# Patient Record
Sex: FEMALE | Employment: FULL TIME | URBAN - METROPOLITAN AREA
[De-identification: names, ages, dates, MRNs, and addresses within clinical notes are randomized per-mention and may not be internally consistent; named-entity substitution may affect disease eponyms.]

---

## 2018-02-09 ENCOUNTER — TELEPHONE (OUTPATIENT)
Dept: OTHER | Facility: CLINIC | Age: 53
End: 2018-02-09

## 2018-11-29 ENCOUNTER — HOSPITAL ENCOUNTER (EMERGENCY)
Facility: CLINIC | Age: 53
Discharge: HOME | End: 2018-11-29
Attending: EMERGENCY MEDICINE | Admitting: EMERGENCY MEDICINE
Payer: COMMERCIAL

## 2018-11-29 ENCOUNTER — APPOINTMENT (OUTPATIENT)
Dept: GENERAL RADIOLOGY | Facility: CLINIC | Age: 53
End: 2018-11-29
Attending: EMERGENCY MEDICINE
Payer: COMMERCIAL

## 2018-11-29 VITALS
RESPIRATION RATE: 18 BRPM | DIASTOLIC BLOOD PRESSURE: 95 MMHG | HEART RATE: 77 BPM | SYSTOLIC BLOOD PRESSURE: 149 MMHG | TEMPERATURE: 98.6 F | OXYGEN SATURATION: 99 %

## 2018-11-29 DIAGNOSIS — K21.00 GASTROESOPHAGEAL REFLUX DISEASE WITH ESOPHAGITIS: ICD-10-CM

## 2018-11-29 DIAGNOSIS — R07.89 OTHER CHEST PAIN: ICD-10-CM

## 2018-11-29 LAB
ALBUMIN SERPL-MCNC: 3.8 G/DL (ref 3.4–5)
ALP SERPL-CCNC: 111 U/L (ref 40–150)
ALT SERPL W P-5'-P-CCNC: 55 U/L (ref 0–50)
ANION GAP SERPL CALCULATED.3IONS-SCNC: 5 MMOL/L (ref 3–14)
AST SERPL W P-5'-P-CCNC: 170 U/L (ref 0–45)
BASOPHILS # BLD AUTO: 0 10E9/L (ref 0–0.2)
BASOPHILS NFR BLD AUTO: 0.4 %
BILIRUB SERPL-MCNC: 0.7 MG/DL (ref 0.2–1.3)
BUN SERPL-MCNC: 14 MG/DL (ref 7–30)
CALCIUM SERPL-MCNC: 8.6 MG/DL (ref 8.5–10.1)
CHLORIDE SERPL-SCNC: 108 MMOL/L (ref 94–109)
CO2 SERPL-SCNC: 29 MMOL/L (ref 20–32)
CREAT SERPL-MCNC: 0.58 MG/DL (ref 0.52–1.04)
DIFFERENTIAL METHOD BLD: ABNORMAL
EOSINOPHIL # BLD AUTO: 0 10E9/L (ref 0–0.7)
EOSINOPHIL NFR BLD AUTO: 0.1 %
ERYTHROCYTE [DISTWIDTH] IN BLOOD BY AUTOMATED COUNT: 13.2 % (ref 10–15)
GFR SERPL CREATININE-BSD FRML MDRD: 0 ML/MIN/1.7M2
GLUCOSE SERPL-MCNC: 106 MG/DL (ref 70–99)
HCT VFR BLD AUTO: 43.3 % (ref 35–47)
HGB BLD-MCNC: 13.8 G/DL (ref 11.7–15.7)
IMM GRANULOCYTES # BLD: 0 10E9/L (ref 0–0.4)
IMM GRANULOCYTES NFR BLD: 0.3 %
LIPASE SERPL-CCNC: 260 U/L (ref 73–393)
LYMPHOCYTES # BLD AUTO: 1.3 10E9/L (ref 0.8–5.3)
LYMPHOCYTES NFR BLD AUTO: 18.7 %
MCH RBC QN AUTO: 26 PG (ref 26.5–33)
MCHC RBC AUTO-ENTMCNC: 31.9 G/DL (ref 31.5–36.5)
MCV RBC AUTO: 82 FL (ref 78–100)
MONOCYTES # BLD AUTO: 0.3 10E9/L (ref 0–1.3)
MONOCYTES NFR BLD AUTO: 4 %
NEUTROPHILS # BLD AUTO: 5.5 10E9/L (ref 1.6–8.3)
NEUTROPHILS NFR BLD AUTO: 76.5 %
NRBC # BLD AUTO: 0 10*3/UL
NRBC BLD AUTO-RTO: 0 /100
PLATELET # BLD AUTO: 170 10E9/L (ref 150–450)
POTASSIUM SERPL-SCNC: 3.5 MMOL/L (ref 3.4–5.3)
PROT SERPL-MCNC: 8 G/DL (ref 6.8–8.8)
RBC # BLD AUTO: 5.3 10E12/L (ref 3.8–5.2)
SODIUM SERPL-SCNC: 142 MMOL/L (ref 133–144)
TROPONIN I BLD-MCNC: 0 UG/L (ref 0–0.08)
TROPONIN I SERPL-MCNC: <0.015 UG/L (ref 0–0.04)
WBC # BLD AUTO: 7.2 10E9/L (ref 4–11)

## 2018-11-29 PROCEDURE — 80053 COMPREHEN METABOLIC PANEL: CPT | Performed by: EMERGENCY MEDICINE

## 2018-11-29 PROCEDURE — 84484 ASSAY OF TROPONIN QUANT: CPT | Performed by: EMERGENCY MEDICINE

## 2018-11-29 PROCEDURE — 25000125 ZZHC RX 250: Performed by: EMERGENCY MEDICINE

## 2018-11-29 PROCEDURE — 93005 ELECTROCARDIOGRAM TRACING: CPT

## 2018-11-29 PROCEDURE — 96374 THER/PROPH/DIAG INJ IV PUSH: CPT

## 2018-11-29 PROCEDURE — 99285 EMERGENCY DEPT VISIT HI MDM: CPT | Mod: 25

## 2018-11-29 PROCEDURE — 83690 ASSAY OF LIPASE: CPT | Performed by: EMERGENCY MEDICINE

## 2018-11-29 PROCEDURE — 93005 ELECTROCARDIOGRAM TRACING: CPT | Mod: 76

## 2018-11-29 PROCEDURE — 25000132 ZZH RX MED GY IP 250 OP 250 PS 637: Performed by: EMERGENCY MEDICINE

## 2018-11-29 PROCEDURE — 84484 ASSAY OF TROPONIN QUANT: CPT

## 2018-11-29 PROCEDURE — 85025 COMPLETE CBC W/AUTO DIFF WBC: CPT | Performed by: EMERGENCY MEDICINE

## 2018-11-29 PROCEDURE — 71046 X-RAY EXAM CHEST 2 VIEWS: CPT

## 2018-11-29 PROCEDURE — 25000128 H RX IP 250 OP 636: Performed by: EMERGENCY MEDICINE

## 2018-11-29 RX ORDER — MORPHINE SULFATE 4 MG/ML
4 INJECTION, SOLUTION INTRAMUSCULAR; INTRAVENOUS ONCE
Status: COMPLETED | OUTPATIENT
Start: 2018-11-29 | End: 2018-11-29

## 2018-11-29 RX ORDER — SODIUM CHLORIDE 9 MG/ML
1000 INJECTION, SOLUTION INTRAVENOUS CONTINUOUS
Status: DISCONTINUED | OUTPATIENT
Start: 2018-11-29 | End: 2018-11-29 | Stop reason: HOSPADM

## 2018-11-29 RX ADMIN — MORPHINE SULFATE 4 MG: 4 INJECTION INTRAVENOUS at 14:11

## 2018-11-29 RX ADMIN — LIDOCAINE HYDROCHLORIDE 30 ML: 20 SOLUTION ORAL; TOPICAL at 12:46

## 2018-11-29 ASSESSMENT — ENCOUNTER SYMPTOMS
NAUSEA: 1
LIGHT-HEADEDNESS: 0
DIARRHEA: 0
VOMITING: 1
ABDOMINAL PAIN: 1
DIZZINESS: 0

## 2018-11-29 NOTE — ED TRIAGE NOTES
Pt with left upper quadrant pain, mid-epigastric, and left sided chest pain for the past two days along with vomiting today. Slight SOB also. ABC's intact, alert and oriented X3.

## 2018-11-29 NOTE — ED AVS SNAPSHOT
[facility] Emergency Department    [address] [place] [address]    Phone:  [phone]    Fax:  [phone]                                       Shweta Ramirez   MRN: [id number]    Department:  [facility] Emergency Department   Date of Visit:  11/29/2018           Patient Information     Date Of Birth          1965        Your diagnoses for this visit were:     Other chest pain     Gastroesophageal reflux disease with esophagitis        You were seen by Adryan Cooper MD.      Follow-up Information     Follow up with your doctor. Schedule an appointment as soon as possible for a visit in 4 days.        Discharge Instructions       Discharge Instructions  Chest Pain    You have been seen today for chest pain or discomfort.  At this time, your doctor has found no signs that your chest pain is due to a serious or life-threatening condition, (or you have declined more testing and/or admission to the hospital). However, sometimes there is a serious problem that does not show up right away. Your evaluation today may not be complete and you may need further testing and evaluation.     You need to follow-up with your regular doctor within 3 days.    Return to the Emergency Department if:    Your chest pain changes, gets worse, starts to happen more often, or comes with less activity.    You are short of breath.    You get very weak or tired.    You pass out or faint.    You have any new symptoms, like fever, cough, numb legs, or you cough up blood.    You have anything else that worries you.    Until you follow-up with your regular doctor please do the following:    Take one aspirin daily unless you have an allergy or are told not to by your doctor.    If a stress test appointment has been made, go to the appointment.    If you have questions, contact your regular doctor.    If your doctor today has told you to follow-up with your regular doctor, it is very  important that you make an appointment with your clinic and go to the appointment.  If you do not follow-up with your primary doctor, it may result in missing an important development which could result in permanent injury or disability and/or lasting pain.  If there is any problem keeping your appointment, call your doctor or return to the Emergency Department.    If you were given a prescription for medicine here today, be sure to read all of the information (including the package insert) that comes with your prescription.  This will include important information about the medicine, its side effects, and any warnings that you need to know about.  The pharmacist who fills the prescription can provide more information and answer questions you may have about the medicine.  If you have questions or concerns that the pharmacist cannot address, please call or return to the Emergency Department.     Opioid Medication Information    Pain medications are among the most commonly prescribed medicines, so we are including this information for all our patients. If you did not receive pain medication or get a prescription for pain medicine, you can ignore it.     You may have been given a prescription for an opioid (narcotic) pain medicine and/or have received a pain medicine while here in the Emergency Department. These medicines can make you drowsy or impaired. You must not drive, operate dangerous equipment, or engage in any other dangerous activities while taking these medications. If you drive while taking these medications, you could be arrested for DUI, or driving under the influence. Do not drink any alcohol while you are taking these medications.     Opioid pain medications can cause addiction. If you have a history of chemical dependency of any type, you are at a higher risk of becoming addicted to pain medications.  Only take these prescribed medications to treat your pain when all other options have been tried. Take  it for as short a time and as few doses as possible. Store your pain pills in a secure place, as they are frequently stolen and provide a dangerous opportunity for children or visitors in your house to start abusing these powerful medications. We will not replace any lost or stolen medicine.  As soon as your pain is better, you should flush all your remaining medication.     Many prescription pain medications contain Tylenol  (acetaminophen), including Vicodin , Tylenol #3 , Norco , Lortab , and Percocet .  You should not take any extra pills of Tylenol  if you are using these prescription medications or you can get very sick.  Do not ever take more than 3000 mg of acetaminophen in any 24 hour period.    All opioids tend to cause constipation. Drink plenty of water and eat foods that have a lot of fiber, such as fruits, vegetables, prune juice, apple juice and high fiber cereal.  Take a laxative if you don t move your bowels at least every other day. Miralax , Milk of Magnesia, Colace , or Senna  can be used to keep you regular.      Remember that you can always come back to the Emergency Department if you are not able to see your regular doctor in the amount of time listed above, if you get any new symptoms, or if there is anything that worries you.          24 Hour Appointment Hotline       To make an appointment at any [facility] clinic, call [phone] ([phone]). If you don't have a family doctor or clinic, we will help you find one. [facility] clinics are conveniently located to serve the needs of you and your family.             Review of your medicines      START taking        Dose / Directions Last dose taken    omeprazole 20 MG DR capsule   Commonly known as:  priLOSEC   Dose:  20 mg   Quantity:  30 capsule        Take 1 capsule (20 mg) by mouth daily   Refills:  0                Prescriptions were sent or printed at these locations (1 Prescription)                   Other Prescriptions                 Printed at Department/Unit printer (1 of 1)         omeprazole (PRILOSEC) 20 MG DR capsule                Procedures and tests performed during your visit     CBC with platelets differential    Comprehensive metabolic panel    EKG 12 lead    EKG 12-lead, tracing only    ISTAT troponin nursing POCT    Lipase    Peripheral IV: Standard    Troponin I    Troponin POCT    XR Chest 2 Views      Orders Needing Specimen Collection     None      Pending Results     Date and Time Order Name Status Description    11/29/2018 1202 EKG 12-lead, tracing only Preliminary             Pending Culture Results     No orders found from 11/27/2018 to 11/30/2018.            Pending Results Instructions     If you had any lab results that were not finalized at the time of your Discharge, you can call the ED Lab Result RN at [phone]. You will be contacted by this team for any positive Lab results or changes in treatment. The nurses are available 7 days a week from 10A to 6:30P.  You can leave a message 24 hours per day and they will return your call.        Test Results From Your Hospital Stay        11/29/2018  1:46 PM      Component Results     Component Value Ref Range & Units Status    WBC 7.2 4.0 - 11.0 10e9/L Final    RBC Count 5.30 (H) 3.8 - 5.2 10e12/L Final    QA FLAGS AND/OR RANGES MODIFIED BY DEMOGRAPHIC UPDATE ON 11/29 AT 1346    Hemoglobin 13.8 11.7 - 15.7 g/dL Final    QA FLAGS AND/OR RANGES MODIFIED BY DEMOGRAPHIC UPDATE ON 11/29 AT 1346    Hematocrit 43.3 35.0 - 47.0 % Final    QA FLAGS AND/OR RANGES MODIFIED BY DEMOGRAPHIC UPDATE ON 11/29 AT 1346    MCV 82 78 - 100 fl Final    MCH 26.0 (L) 26.5 - 33.0 pg Final    MCHC 31.9 31.5 - 36.5 g/dL Final    RDW 13.2 10.0 - 15.0 % Final    Platelet Count 170 150 - 450 10e9/L Final    Diff Method Automated Method  Final    % Neutrophils 76.5 % Final    % Lymphocytes 18.7 % Final    % Monocytes 4.0 % Final    % Eosinophils 0.1 % Final    % Basophils 0.4 % Final    % Immature  Granulocytes 0.3 % Final    Nucleated RBCs 0 0 /100 Final    QA FLAGS AND/OR RANGES MODIFIED BY DEMOGRAPHIC UPDATE ON 11/29 AT 1346    Absolute Neutrophil 5.5 1.6 - 8.3 10e9/L Final    Absolute Lymphocytes 1.3 0.8 - 5.3 10e9/L Final    Absolute Monocytes 0.3 0.0 - 1.3 10e9/L Final    Absolute Eosinophils 0.0 0.0 - 0.7 10e9/L Final    Absolute Basophils 0.0 0.0 - 0.2 10e9/L Final    Abs Immature Granulocytes 0.0 0 - 0.4 10e9/L Final    Absolute Nucleated RBC 0.0  Final         11/29/2018  1:46 PM      Component Results     Component Value Ref Range & Units Status    Sodium 142 133 - 144 mmol/L Final    QA FLAGS AND/OR RANGES MODIFIED BY DEMOGRAPHIC UPDATE ON 11/29 AT 1346    Potassium 3.5 3.4 - 5.3 mmol/L Final    QA FLAGS AND/OR RANGES MODIFIED BY DEMOGRAPHIC UPDATE ON 11/29 AT 1346    Chloride 108 94 - 109 mmol/L Final    QA FLAGS AND/OR RANGES MODIFIED BY DEMOGRAPHIC UPDATE ON 11/29 AT 1346    Carbon Dioxide 29 20 - 32 mmol/L Final    QA FLAGS AND/OR RANGES MODIFIED BY DEMOGRAPHIC UPDATE ON 11/29 AT 1346    Anion Gap 5 3 - 14 mmol/L Final    QA FLAGS AND/OR RANGES MODIFIED BY DEMOGRAPHIC UPDATE ON 11/29 AT 1346    Glucose 106 (H) 70 - 99 mg/dL Final    Urea Nitrogen 14 7 - 30 mg/dL Final    QA FLAGS AND/OR RANGES MODIFIED BY DEMOGRAPHIC UPDATE ON 11/29 AT 1346    Creatinine 0.58 0.52 - 1.04 mg/dL Final    QA FLAGS AND/OR RANGES MODIFIED BY DEMOGRAPHIC UPDATE ON 11/29 AT 1346    GFR Estimate 0 (L) >60 mL/min/1.7m2 Final    Non  GFR Calc  QA FLAGS AND/OR RANGES MODIFIED BY DEMOGRAPHIC UPDATE ON 11/29 AT 1346      GFR Estimate If Black 0 (L) >60 mL/min/1.7m2 Final     GFR Calc  QA FLAGS AND/OR RANGES MODIFIED BY DEMOGRAPHIC UPDATE ON 11/29 AT 1346      Calcium 8.6 8.5 - 10.1 mg/dL Final    QA FLAGS AND/OR RANGES MODIFIED BY DEMOGRAPHIC UPDATE ON 11/29 AT 1346    Bilirubin Total 0.7 0.2 - 1.3 mg/dL Final    QA FLAGS AND/OR RANGES MODIFIED BY DEMOGRAPHIC UPDATE ON 11/29 AT 1344     Albumin 3.8 3.4 - 5.0 g/dL Final    QA FLAGS AND/OR RANGES MODIFIED BY DEMOGRAPHIC UPDATE ON 11/29 AT 1346    Protein Total 8.0 6.8 - 8.8 g/dL Final    QA FLAGS AND/OR RANGES MODIFIED BY DEMOGRAPHIC UPDATE ON 11/29 AT 1346    Alkaline Phosphatase 111 40 - 150 U/L Final    QA FLAGS AND/OR RANGES MODIFIED BY DEMOGRAPHIC UPDATE ON 11/29 AT 1346    ALT 55 (H) 0 - 50 U/L Final    QA FLAGS AND/OR RANGES MODIFIED BY DEMOGRAPHIC UPDATE ON 11/29 AT 1346     (H) 0 - 45 U/L Final    QA FLAGS AND/OR RANGES MODIFIED BY DEMOGRAPHIC UPDATE ON 11/29 AT 1346         11/29/2018  1:46 PM      Component Results     Component Value Ref Range & Units Status    Lipase 260 73 - 393 U/L Final    QA FLAGS AND/OR RANGES MODIFIED BY DEMOGRAPHIC UPDATE ON 11/29 AT 1346         11/29/2018  1:21 PM      Narrative     XR CHEST 2 VW 11/29/2018 1:14 PM     HISTORY: chest pain;         Impression     IMPRESSION: Negative exam.    GLENDY SOLANO MD         11/29/2018  1:09 PM      Component Results     Component Value Ref Range & Units Status    Troponin I 0.00 0.00 - 0.08 ug/L Final         11/29/2018  2:16 PM      Component Results     Component Value Ref Range & Units Status    Troponin I ES <0.015 0.000 - 0.045 ug/L Final    The 99th percentile for upper reference range is 0.045 ug/L.  Troponin values   in the range of 0.045 - 0.120 ug/L may be associated with risks of adverse   clinical events.                  Clinical Quality Measure: Blood Pressure Screening     Your blood pressure was checked while you were in the emergency department today. The last reading we obtained was  BP: (!) 149/95 . Please read the guidelines below about what these numbers mean and what you should do about them.  If your systolic blood pressure (the top number) is less than 120 and your diastolic blood pressure (the bottom number) is less than 80, then your blood pressure is normal. There is nothing more that you need to do about it.  If your systolic blood  "pressure (the top number) is 120-139 or your diastolic blood pressure (the bottom number) is 80-89, your blood pressure may be higher than it should be. You should have your blood pressure rechecked within a year by a primary care provider.  If your systolic blood pressure (the top number) is 140 or greater or your diastolic blood pressure (the bottom number) is 90 or greater, you may have high blood pressure. High blood pressure is treatable, but if left untreated over time it can put you at risk for heart attack, stroke, or kidney failure. You should have your blood pressure rechecked by a primary care provider within the next 4 weeks.  If your provider in the emergency department today gave you specific instructions to follow-up with your doctor or provider even sooner than that, you should follow that instruction and not wait for up to 4 weeks for your follow-up visit.        Thank you for choosing [facility]       Thank you for choosing [facility] for your care. Our goal is always to provide you with excellent care. Hearing back from our patients is one way we can continue to improve our services. Please take a few minutes to complete the written survey that you may receive in the mail after you visit with us. Thank you!        [facility] Information     etouches lets you send messages to your doctor, view your test results, renew your prescriptions, schedule appointments and more. To sign up, go to [email or web address] . Click on \"Log in\" on the left side of the screen, which will take you to the Welcome page. Then click on \"Sign up Now\" on the right side of the page.     You will be asked to enter the access code listed below, as well as some personal information. Please follow the directions to create your username and password.     Your access code is: [email or web address]  Expires: 2019  2:49 PM     Your access code will  in 90 days. If you need help or a new code, please call your [facility] clinic or " [phone].        Care EveryWhere ID     This is your Care EveryWhere ID. This could be used by other organizations to access your [facility] medical records  [id number]        Equal Access to Services     MARCELINA SEVILLA : Mitzi Marx, wadaniloda liborioadaha, qaybta kaalmada saraicelineiris, oc jimenes. So [facility] [phone].    ATENCIÓN: Si habla español, tiene a kinsey disposición servicios gratuitos de asistencia lingüística. Llame al [phone].    We comply with applicable federal civil rights laws and [place] laws. We do not discriminate on the basis of race, color, national origin, age, disability, sex, sexual orientation, or gender identity.            After Visit Summary       This is your record. Keep this with you and show to your community pharmacist(s) and doctor(s) at your next visit.

## 2018-11-29 NOTE — ED PROVIDER NOTES
History     Chief Complaint:  Abdominal Pain    HPI   Shweta Ramirez is a 52 year old who presents with epigastric pain and left upper quadrant abdominal pain today around 08:00 while at work. She has vomited 4 times today, yellow-colored emesis, and has not been able to keep anything down. At the worst, the patient rates her pain as 10/10, but currently her pain is 9/10. Nothing maker her pain worse, but she does note feeling better after vomiting. The patient moved here from \[facility]\"" about 3 years ago and has had 4 episodes of similar symptoms since then. The patient denies diarrhea. No lightheadedness or dizziness.       Allergies:  No Known Drug Allergies      Medications:    The patient is not currently taking any prescribed medications.      Past Medical History:    History reviewed. No pertinent past medical history.     Past Surgical History:    History reviewed. No pertinent past surgical history.     Family History:    Diabetes - mother     Social History:  The patient was accompanied to the ED by family.  Smoking Status: never  Alcohol Use: occasional       Review of Systems   Gastrointestinal: Positive for abdominal pain, nausea and vomiting. Negative for diarrhea.   Neurological: Negative for dizziness and light-headedness.   All other systems reviewed and are negative.    Physical Exam     Patient Vitals for the past 24 hrs:   BP Temp Temp src Pulse Heart Rate Resp SpO2   11/29/18 1445 (!) 149/95 - - - - - 99 %   11/29/18 1430 (!) 143/97 - - - - - 99 %   11/29/18 1415 (!) 159/97 - - - - - 98 %   11/29/18 1405 (!) 180/100 - - - - - -   11/29/18 1300 (!) 150/91 - - - - - 98 %   11/29/18 1245 - - - - - - 96 %   11/29/18 1230 149/87 - - - - - 96 %   11/29/18 1215 - - - - - - 97 %   11/29/18 1200 (!) 149/99 - - - - - 98 %   11/29/18 1129 (!) 155/91 98.6  F (37  C) Temporal 77 77 18 99 %      Physical Exam  General: The patient is alert, in no respiratory distress.    HENT: Mucous membranes  moist.    Cardiovascular: Regular rate and rhythm. Good pulses in all four extremities. Normal capillary refill and skin turgor.     Respiratory: Lungs are clear. No nasal flaring. No retractions. No wheezing, no crackles.    Gastrointestinal: Epigastric abdominal tenderness. Abdomen soft. No guarding, no rebound. No palpable hernias.     Musculoskeletal: No gross deformity.     Skin: No rashes or petechiae.     Neurologic: The patient is alert and oriented x3. GCS 15. No testable cranial nerve deficit. Follows commands with clear and appropriate speech. Gives appropriate answers. Good strength in all extremities. No gross neurologic deficit. Gross sensation intact. Pupils are round and reactive. No meningismus.     Lymphatic: No cervical adenopathy. No lower extremity swelling.    Psychiatric: The patient is non-tearful.       Emergency Department Course     ECG:  ECG taken at 1137, ECG read at 1145  Normal sinus rhythm  T wave abnormality, T wave inversion V1-V3  Rate 74 bpm. SC interval 162. QRS duration 96. QT/QTc 402/446. P-R-T axes 33,54,42.     Repeat ECG taken at 1243, ECG read at 1254  Normal sinus rhythm   T wave abnormality   Rate 71 bpm. SC interval 170. QRS duration 94. QT/QTc 408/443. P-R-T axes 37,54,36.     Imaging:  Radiology findings were communicated with the patient who voiced understanding of the findings.    XR Chest 2 Views  Final Result  Negative exam.  GLENDY SOLANO MD    Laboratory:  Laboratory findings were communicated with the patient who voiced understanding of the findings.    Troponin POCT (Collected 1255): 0.00  Troponin (Collected 1256): <0.015    CBC: WBC 7.2, HGB 13.8,   CMP: GFR 0 (L), ALT 55 (H),  (H), Glucose 106 (H) o/w WNL (Creatinine 0.58)  Lipase: 260    Interventions:  1246 Xylocaine 2% 15mL, Mylanta 15mL PO   1411 Morphine, 4 mg, IV      Emergency Department Course:  Nursing notes and vitals reviewed.  I entered the room.  I performed an exam of the patient  as documented above.     EKG obtained in the ED, see results above.      IV was inserted and blood was drawn for laboratory testing, results above.    The patient was sent for X-ray while in the emergency department, results above.     The patient received the above intervention(s).     1404 the patient was rechecked and updated regarding the results of the laboratory and imaging studies.      I discussed the treatment plan with the patient. They expressed understanding of this plan and consented to discharge. They will be discharged home with instructions for care and follow up. In addition, the patient will return to the emergency department if their symptoms worsen, if new symptoms arise or if there is any concern.  All questions were answered.     Impression & Plan      Medical Decision Making:  Shweta Ramirez is a 52 year old female with history of GERD and has not been taking her medication the last 3 years. But over the last several months, she has had several episodes of similar pain, burning sensations. No shortness of breath. I did perform serial EKGs, which showed no signs of any pathologic ST elevation. Troponin is negative. Her symptoms have been going on for almost 6 hours now and therefore I expect it to take at this point, although her history is atypical. She can follow-up as an outpatient with her doctor for further stress testing. There are no signs of cardiac ischemia and she was discharged home in good/stable condition.    Diagnosis:    ICD-10-CM    1. Other chest pain R07.89 Troponin I   2. Gastroesophageal reflux disease with esophagitis K21.0      Disposition:   The patient was discharged to home.    Discharge Medications:  Discharge Medication List as of 11/29/2018  2:49 PM      START taking these medications    Details   omeprazole (PRILOSEC) 20 MG DR capsule Take 1 capsule (20 mg) by mouth daily, Disp-30 capsule, R-0, Local Print           Scribe Disclosure:  Valentín MIRELES, am  serving as a scribe at 11:50 AM on 11/29/2018 to document services personally performed by Adryan Cooper MD, based on my observations and the provider's statements to me.   [facility] EMERGENCY DEPARTMENT       Adryan Cooper MD  11/30/18 1925

## 2018-11-29 NOTE — ED AVS SNAPSHOT
[facility] Emergency Department    [address]    [facility] [address]    Phone:  [phone]    Fax:  [phone]                                       Shweta Ramirez   MRN: [id number]    Department:  [facility] Emergency Department   Date of Visit:  11/29/2018           After Visit Summary Signature Page     I have received my discharge instructions, and my questions have been answered. I have discussed any challenges I see with this plan with the nurse or doctor.    ..........................................................................................................................................  Patient/Patient Representative Signature      ..........................................................................................................................................  Patient Representative Print Name and Relationship to Patient    ..................................................               ................................................  Date                                   Time    ..........................................................................................................................................  Reviewed by Signature/Title    ...................................................              ..............................................  Date                                               Time          22EPIC Rev 08/18

## 2018-11-30 LAB
INTERPRETATION ECG - MUSE: NORMAL
INTERPRETATION ECG - MUSE: NORMAL